# Patient Record
Sex: MALE
[De-identification: names, ages, dates, MRNs, and addresses within clinical notes are randomized per-mention and may not be internally consistent; named-entity substitution may affect disease eponyms.]

---

## 2020-11-05 ENCOUNTER — APPOINTMENT (OUTPATIENT)
Dept: PEDIATRIC INFECTIOUS DISEASE | Facility: CLINIC | Age: 20
End: 2020-11-05
Payer: SELF-PAY

## 2020-11-05 DIAGNOSIS — Z23 ENCOUNTER FOR IMMUNIZATION: ICD-10-CM

## 2020-11-05 DIAGNOSIS — Z71.84 ENC FOR HEALTH COUNSELING RELATED TO TRAVEL: ICD-10-CM

## 2020-11-05 PROBLEM — Z00.00 ENCOUNTER FOR PREVENTIVE HEALTH EXAMINATION: Status: ACTIVE | Noted: 2020-11-05

## 2020-11-05 PROCEDURE — 99201 OFFICE OUTPATIENT NEW 10 MINUTES: CPT

## 2020-11-05 NOTE — CONSULT LETTER
[Dear  ___] : Dear  [unfilled], [Consult Letter:] : I had the pleasure of evaluating your patient, [unfilled]. [Consult Closing:] : Thank you very much for allowing me to participate in the care of this patient.  If you have any questions, please do not hesitate to contact me. [Sincerely,] : Sincerely, [FreeTextEntry3] : Ervin Oviedo MD MSc\par Division of Pediatric Infectious Diseases\par